# Patient Record
Sex: MALE | Race: WHITE
[De-identification: names, ages, dates, MRNs, and addresses within clinical notes are randomized per-mention and may not be internally consistent; named-entity substitution may affect disease eponyms.]

---

## 2018-06-04 ENCOUNTER — HOSPITAL ENCOUNTER (EMERGENCY)
Dept: HOSPITAL 68 - ERH | Age: 83
End: 2018-06-04
Payer: COMMERCIAL

## 2018-06-04 VITALS — SYSTOLIC BLOOD PRESSURE: 129 MMHG | DIASTOLIC BLOOD PRESSURE: 70 MMHG

## 2018-06-04 VITALS — BODY MASS INDEX: 31.5 KG/M2 | WEIGHT: 220 LBS | HEIGHT: 70 IN

## 2018-06-04 DIAGNOSIS — Z87.891: ICD-10-CM

## 2018-06-04 DIAGNOSIS — I48.91: ICD-10-CM

## 2018-06-04 DIAGNOSIS — R19.7: Primary | ICD-10-CM

## 2018-06-04 DIAGNOSIS — I10: ICD-10-CM

## 2018-06-04 LAB
ABSOLUTE GRANULOCYTE CT: 8.7 /CUMM (ref 1.4–6.5)
BASOPHILS # BLD: 0 /CUMM (ref 0–0.2)
BASOPHILS NFR BLD: 0.3 % (ref 0–2)
EOSINOPHIL # BLD: 0 /CUMM (ref 0–0.7)
EOSINOPHIL NFR BLD: 0.2 % (ref 0–5)
ERYTHROCYTE [DISTWIDTH] IN BLOOD BY AUTOMATED COUNT: 15.9 % (ref 11.5–14.5)
GRANULOCYTES NFR BLD: 76.4 % (ref 42.2–75.2)
HCT VFR BLD CALC: 45.5 % (ref 42–52)
LYMPHOCYTES # BLD: 1.4 /CUMM (ref 1.2–3.4)
MCH RBC QN AUTO: 29.8 PG (ref 27–31)
MCHC RBC AUTO-ENTMCNC: 33 G/DL (ref 33–37)
MCV RBC AUTO: 90.4 FL (ref 80–94)
MONOCYTES # BLD: 1.2 /CUMM (ref 0.1–0.6)
PLATELET # BLD: 162 /CUMM (ref 130–400)
PMV BLD AUTO: 9.1 FL (ref 7.4–10.4)
PROTHROMBIN TIME: 32.5 SEC (ref 9.4–12.5)
RED BLOOD CELL CT: 5.04 /CUMM (ref 4.7–6.1)
WBC # BLD AUTO: 11.4 /CUMM (ref 4.8–10.8)

## 2018-06-04 NOTE — ED GENERAL ADULT
History of Present Illness
 
General
Chief Complaint: General Adult
Stated Complaint: DIAHRREA
Source: patient
Exam Limitations: no limitations
 
Vital Signs & Intake/Output
Vital Signs & Intake/Output
 Vital Signs
 
 
Date Time Temp Pulse Resp B/P B/P Pulse O2 O2 Flow FiO2
 
     Mean Ox Delivery Rate 
 
06/04 1343       Room Air  
 
06/04 1219 97.0 79 20 123/73  96 Room Air  
 
 
 
Allergies
Coded Allergies:
NO KNOWN ALLERGIES (05/18/11)
  NKA PER ANTIBIOTIC SHEET ON 5/18/11 -SSJ
 
Reconcile Medications
Lisinopril 5 MG TABLET   1 TAB PO DAILY HEART  (Reported)
Mexiletine HCl 150 MG CAPSULE   1 CAP PO TID GI  (Reported)
Oxybutynin Chloride (Oxybutynin Chloride ER) 5 MG TAB.ER.24   1 TAB PO DAILY 
BLADDER  (Reported)
Simvastatin (Simvastatin*) 20 MG TABLET   1 TAB PO QPM CHOLESTEROL  (Reported)
Sotalol HCl (Sotalol) 120 MG TABLET   1 TAB PO BID HEART  (Reported)
Tamsulosin HCl 0.4 MG CAP.ER.24H   1 CAP PO QPM BLADDER  (Reported)
Warfarin Sodium 1 MG TABLET   3 TAB PO 1700 BLOOD THINNER  (Reported)
 
Triage Note:
PT TO ED C/O DIARRHEA SINCE 0200 THIS AM.
 STATES NONSTOP SINCE THEN. STATES HE HAD C/P
 YESTERDAY, NONE NOW. EKG DONE. DENIES N/V.
Triage Nurses Notes Reviewed? yes
HPI:
Patient is an 82-year-old male with past medical history as outlined below who 
presents today with his wife and daughter for diarrhea.  He reports that the 
diarrhea has been persistent and was acute in onset since the early hours of 
this morning.  He reports that it is "dark" but does not look like mendoza blood. 
He denies any known history of peptic ulcer disease or GI bleeding.  He is 
scheduled to  be seen tomorrow for preoperative workup at the [facility] for 
special surgery in [facility] for a planned procedure for cervical stenosis. 
His primary physician sent him in for evaluation in the hopes that he can still 
undergo this workup.  He has no history of C. difficile, and has not had a 
recent antibiotic course.  No contacts at home are expressing similar GI 
distress.
 
Past History
 
Travel History
Traveled to [place] past 21 day No
 
Medical History
Any Pertinent Medical History? see below for history
Cardiovascular: AFIB, hypertension
Cancer(s): prostate cancer
Pneumonia Vaccine: 10/01/06
 
Surgical History
Surgical History: non-contributory
 
Psychosocial History
Who do you live with Spouse
Services at Home None
What is your primary language English
Tobacco Use: Quit >30 days ago
ETOH Use: denies use
Illicit Drug Use: denies illicit drug use
 
Family History
Hx Contributory? No
 
Review of Systems
 
Review of Systems
Constitutional:
Reports: see HPI, weakness. 
EENTM:
Reports: no symptoms. 
Respiratory:
Reports: no symptoms. 
Cardiovascular:
Reports: no symptoms. 
GI:
Reports: diarrhea.  Denies: abdominal pain. 
Genitourinary:
Reports: no symptoms. 
Musculoskeletal:
Reports: no symptoms. 
Skin:
Reports: no symptoms. 
Neurological/Psychological:
Reports: no symptoms. 
Hematologic/Endocrine:
Reports: no symptoms. 
Immunologic/Allergic:
Reports: no symptoms. 
All Other Systems: Reviewed and Negative
 
Physical Exam
 
Physical Exam
General Appearance: well developed/nourished, no apparent distress, alert, awake
Comments:
HEENT: Inspection of the head reveals a normocephalic cranium with no signs of 
trauma.
Ophtho: Extraocular muscles are intact and pupils are equal and reactive to 
light bilaterally with no afferent pupillary defect.  The sclera are noninjected
, and there is no obvious discharge.
Neck: The trachea is midline, there is no obvious asymmetry or mass over the 
thyroid, and there is no midline cervical spine tenderness
Respiratory: The lungs are clear and equal to auscultation bilaterally without 
wheezes, rales, or rhonchi.  The patient exhibits no signs of labored breathing.
Cardiac: Regular rhythm and non-tachycardic without appreciable murmurs on 
auscultation.  No obvious JVD.
GI: Subjective active frequent diarrhea.  Examination of the abdomen reveals 
mild epigastric discomfort on palpation.  There is negative Pavon's sign, 
negative McBurney's point tenderness, negative Toppenish sign, negative Grey-Whitlock
sign, and no signs of peritonitis whatsoever on percussion or deep palpation.  
The skin is intact with no sign of trauma or infection.
: Deferred
Rectal examination: Copious loose stool in the rectal vault.  Nonbloody in 
appearance, but guaiac positive.
Neuro: The patient is oriented to person, place, time, and situation, with no 
obvious focal motor deficits.  There were no sensory deficits, and the patient 
exhibit purposeful movement of all 4 extremities.  Cranial nerves II through XII
are intact, and gait is normal.
Behavioral: Calm and cooperative
Dermatologic: Dermatologic examination reveals no diffuse rashes or exanthems, 
no petechiae, no ecchymoses, and no other signs of erythema or infection.
 
Core Measures
ACS in differential dx? No
CVA/TIA Diagnosis: No
Sepsis Present: No
Sepsis Focused Exam Completed? No
 
Progress
Differential Diagnoses
I considered the following diagnoses in my evaluation of the patient: 
Electrolyte abnormality, gastroenteritis, Clostridium difficile, GI bleed, 
anemia
 
Plan of Care:
 Orders
 
 
Procedure Date/time Status
 
PROTHROMBIN TIME 06/04 1253 Complete
 
COMPREHENSIVE METABOLIC PANEL 06/04 1253 Complete
 
CBC WITHOUT DIFFERENTIAL 06/04 1253 Complete
 
EKG 06/04 1209 Active
 
 
 Laboratory Tests
 
 
 
06/04/18 1440:
Anion Gap 11, Estimated GFR > 60, BUN/Creatinine Ratio 24.0, Glucose 102  H, 
Calcium 8.5, Total Bilirubin 2.1  H, AST 25, ALT 33, Alkaline Phosphatase 72, 
Total Protein 6.2  L, Albumin 3.5, Globulin 2.7, Albumin/Globulin Ratio 1.3
 
06/04/18 1330:
PT 32.5  H, INR 2.95  H, CBC w Diff NO MAN DIFF REQ, RBC 5.04, MCV 90.4, MCH 
29.8, MCHC 33.0, RDW 15.9  H, MPV 9.1, Gran % 76.4  H, Lymphocytes % 12.3  L, 
Monocytes % 10.8  H, Eosinophils % 0.2, Basophils % 0.3, Absolute Granulocytes 
8.7  H, Absolute Lymphocytes 1.4, Absolute Monocytes 1.2  H, Absolute 
Eosinophils 0, Absolute Basophils 0
 
Initial ED EKG: none
Comments:
Patient presented today for diarrhea 24 hours for a preoperative clearance 
workup at [facility] for special surgery.  Guaiac was positive but laboratory 
studies were unremarkable including a hemoglobin of 15.  I discussed this case 
with Dr. Guajardo from GI and reviewed the patient's prior colonoscopy which 
showed only polyps.  Dr. Guajardo felt the patient was safe for discharge home 
as his mildly positive guaiac may be secondary to his history of radiation 
proctitis versus effects of a possible gastroenteritis.  He did not feel that 
there was a clinically significant GI hemorrhage.  He was satisfied with this 
plan and was discharged home in stable condition.  He has been very weak and has
fallen many times recently, however this has been worked up by his primary 
physician and they are in the process of getting him more assistance; they will 
talk to his primary again regarding this issue, but they note that the reason 
for his planned surgery at [facility] on his cervical spine is to hopefully improve his
balance and distal neurologic symptoms.  They declined my offer for 
hospitalization for his weakness or case management for acute rehabilitation 
placement.
 
Departure
 
Departure
Time of Disposition: 1551
Disposition: HOME OR SELF CARE
Condition: Stable
Clinical Impression
Primary Impression: Diarrhea
Qualifiers:  Diarrhea type: presumed infectious Qualified Code: R19.7 - Diarrhea
, unspecified
Referrals:
Lashell PEOPLES,Manish PALOMO (PCP/Family)
 
Additional Instructions:
Please keep your appointment at [facility] tomorrow and then discuss your diarrhea and 
weakness with your primary physician.  Make an appointment for reassessment in 
their office and as always return to the emergency department for any new or 
worsening symptoms.
Departure Forms:
Customer Survey
General Discharge Information
 
Critical Care Note
 
Critical Care Note
Critical Care Time: non-applicable